# Patient Record
Sex: MALE | Race: WHITE | Employment: FULL TIME | URBAN - NONMETROPOLITAN AREA
[De-identification: names, ages, dates, MRNs, and addresses within clinical notes are randomized per-mention and may not be internally consistent; named-entity substitution may affect disease eponyms.]

---

## 2024-04-11 ENCOUNTER — TELEPHONE (OUTPATIENT)
Dept: CARDIOLOGY CLINIC | Age: 49
End: 2024-04-11

## 2024-04-11 ENCOUNTER — OFFICE VISIT (OUTPATIENT)
Dept: CARDIOLOGY CLINIC | Age: 49
End: 2024-04-11
Payer: COMMERCIAL

## 2024-04-11 VITALS
SYSTOLIC BLOOD PRESSURE: 150 MMHG | WEIGHT: 213 LBS | DIASTOLIC BLOOD PRESSURE: 90 MMHG | BODY MASS INDEX: 29.82 KG/M2 | HEIGHT: 71 IN | HEART RATE: 90 BPM | OXYGEN SATURATION: 95 %

## 2024-04-11 DIAGNOSIS — I51.89 GRADE I DIASTOLIC DYSFUNCTION: ICD-10-CM

## 2024-04-11 DIAGNOSIS — I50.23 ACUTE ON CHRONIC SYSTOLIC CONGESTIVE HEART FAILURE, NYHA CLASS 3 (HCC): ICD-10-CM

## 2024-04-11 DIAGNOSIS — I25.5 ISCHEMIC CARDIOMYOPATHY: Primary | ICD-10-CM

## 2024-04-11 DIAGNOSIS — Z91.89 AT RISK FOR FLUID VOLUME OVERLOAD: ICD-10-CM

## 2024-04-11 PROCEDURE — 99214 OFFICE O/P EST MOD 30 MIN: CPT | Performed by: NURSE PRACTITIONER

## 2024-04-11 RX ORDER — TRAMADOL HYDROCHLORIDE 200 MG/1
200 TABLET, EXTENDED RELEASE ORAL DAILY
COMMUNITY
Start: 2024-01-24 | End: 2024-04-23

## 2024-04-11 RX ORDER — CLOPIDOGREL BISULFATE 75 MG/1
75 TABLET ORAL DAILY
COMMUNITY

## 2024-04-11 RX ORDER — LOSARTAN POTASSIUM 50 MG/1
50 TABLET ORAL DAILY
COMMUNITY
End: 2024-04-11 | Stop reason: ALTCHOICE

## 2024-04-11 RX ORDER — CARVEDILOL 12.5 MG/1
12.5 TABLET ORAL 2 TIMES DAILY
Qty: 180 TABLET | Refills: 3 | Status: SHIPPED | OUTPATIENT
Start: 2024-04-11

## 2024-04-11 RX ORDER — ATORVASTATIN CALCIUM 40 MG/1
40 TABLET, FILM COATED ORAL DAILY
COMMUNITY

## 2024-04-11 RX ORDER — DAPAGLIFLOZIN 10 MG/1
10 TABLET, FILM COATED ORAL DAILY
COMMUNITY

## 2024-04-11 RX ORDER — LISINOPRIL 20 MG/1
TABLET ORAL
COMMUNITY
Start: 2015-11-05 | End: 2024-04-11

## 2024-04-11 RX ORDER — SPIRONOLACTONE 25 MG/1
TABLET ORAL
COMMUNITY

## 2024-04-11 RX ORDER — BUPROPION HYDROCHLORIDE 300 MG/1
300 TABLET ORAL EVERY MORNING
COMMUNITY
Start: 2022-02-01

## 2024-04-11 RX ORDER — FOLIC ACID 1 MG/1
1 TABLET ORAL
COMMUNITY

## 2024-04-11 RX ORDER — CARVEDILOL 6.25 MG/1
TABLET ORAL
COMMUNITY
End: 2024-04-11 | Stop reason: SDUPTHER

## 2024-04-11 RX ORDER — LANOLIN ALCOHOL/MO/W.PET/CERES
100 CREAM (GRAM) TOPICAL DAILY
COMMUNITY
Start: 2024-02-01

## 2024-04-11 RX ORDER — TORSEMIDE 20 MG/1
20 TABLET ORAL DAILY
COMMUNITY
Start: 2024-02-01

## 2024-04-11 RX ORDER — ASPIRIN 81 MG/1
81 TABLET, COATED ORAL DAILY
COMMUNITY

## 2024-04-11 ASSESSMENT — ENCOUNTER SYMPTOMS
NAUSEA: 0
SHORTNESS OF BREATH: 1
COUGH: 0

## 2024-04-11 NOTE — PATIENT INSTRUCTIONS
You may receive a survey regarding the care you received during your visit.  Your input is valuable to us.  We encourage you to complete and return your survey.  We hope you will choose us in the future for your healthcare needs.    Your nurses today were Summer.  Office hours:   Mon-Thurs 8-4:30  Friday 8-12  Phone: [phone]    Continue:  Continue current medications  Daily weights and record  Fluid restriction of 2 Liters per day  Limit sodium in diet to around 4280-0548 mg/day  Monitor BP  Activity as tolerated     Call the Heart Failure Clinic for any of the following symptoms:   Weight gain of 2-3 pounds in 1 day or 5 pounds in 1 week  Increased shortness of breath  Shortness of breath while laying down  Cough  Chest pain  Swelling in feet, ankles or legs  Bloating in abdomen  Fatigue

## 2024-04-11 NOTE — PROGRESS NOTES
nephrology as well?)  Psoriasis - on apremilast previously which is a phosphodiesterase 4 inhibitor - if frequent exacerbations will need stopped (has not been taking recently)  Grade 1 DD   LVEDD at 5.2 (previously 6)   RVSP improved from 40 to 23  Cardiac rehab - ordered but pt declined, discussed again and plans on calling     Stable, no fluid on exam today. Tolerating current medications, making adjustments for GDMT. Will order repeat ECHO at next visit. 4 week f/u       GDMT: stopping losartan and starting Entresto, increasing Coreg today    Lab reviewed - K 4.4 Cr 1.32 Mag 2.3 Hgb 15.7    ECHO 2024: no atrial dilation or valvular concerns. RVSP 23  CATH 2024: s/p PCI to LAD/Lx, RCA has 50% lesion     Repeat blood work in two weeks     Start taking Entresto 49/51 twice a day    Start taking Coreg 12.5 twice a day     Check blood pressure 1 hr after morning medications    Continue diet/fluid adherence  Continue daily wts.  F/U w/ Cardiology  F/U in clinic in 4 weeks      Tolerating above noted HF meds, no ill side effects noted. Will continue to monitor kidney function and electrolytes. Will optimize as tolerated.   Pt is compliant w/ medications.    Total visit time of 55 minutes has been spent with patient on education of symptoms, management, medication, and plan of care; as well as review of chart: labs, ECHO, radiology reports, etc.   I personally spent more then 50% of the appt time face to face with the patient.  Daily weights  Fluid restriction of 2 Liters per day  Limit sodium in diet to around 0578-8874 mg/day  Monitor BP  Activity as tolerated     Patient was instructed to call the Heart Failure Clinic for any changes in symptoms as noted in AVS.      Return in about 4 weeks (around 5/9/2024). or sooner if needed     Patient given educational materials - see patient instructions.   We discussed the importance of weighing oneself and recording daily. We also discussed the importance of a low sodium

## 2024-05-01 LAB
BUN BLDV-MCNC: 20 MG/DL
CALCIUM SERPL-MCNC: 8.5 MG/DL
CHLORIDE BLD-SCNC: 110 MMOL/L
CO2: 24 MMOL/L
CREAT SERPL-MCNC: 1.2 MG/DL
EGFR: 69
GLUCOSE BLD-MCNC: 104 MG/DL
POTASSIUM SERPL-SCNC: 4.2 MMOL/L
SODIUM BLD-SCNC: 137 MMOL/L

## 2024-05-10 ENCOUNTER — OFFICE VISIT (OUTPATIENT)
Dept: CARDIOLOGY CLINIC | Age: 49
End: 2024-05-10
Payer: COMMERCIAL

## 2024-05-10 VITALS
HEIGHT: 71 IN | HEART RATE: 89 BPM | OXYGEN SATURATION: 95 % | WEIGHT: 212.5 LBS | DIASTOLIC BLOOD PRESSURE: 62 MMHG | SYSTOLIC BLOOD PRESSURE: 142 MMHG | BODY MASS INDEX: 29.75 KG/M2

## 2024-05-10 DIAGNOSIS — Z91.89 AT RISK FOR FLUID VOLUME OVERLOAD: ICD-10-CM

## 2024-05-10 DIAGNOSIS — I50.23 ACUTE ON CHRONIC SYSTOLIC CONGESTIVE HEART FAILURE, NYHA CLASS 3 (HCC): ICD-10-CM

## 2024-05-10 DIAGNOSIS — I25.5 ISCHEMIC CARDIOMYOPATHY: Primary | ICD-10-CM

## 2024-05-10 DIAGNOSIS — I51.89 GRADE I DIASTOLIC DYSFUNCTION: ICD-10-CM

## 2024-05-10 PROCEDURE — 99214 OFFICE O/P EST MOD 30 MIN: CPT | Performed by: NURSE PRACTITIONER

## 2024-05-10 RX ORDER — TRAMADOL HYDROCHLORIDE 200 MG/1
200 TABLET, EXTENDED RELEASE ORAL DAILY
COMMUNITY
Start: 2015-12-03

## 2024-05-10 RX ORDER — SPIRONOLACTONE 25 MG/1
25 TABLET ORAL DAILY
Qty: 90 TABLET | Refills: 3 | Status: SHIPPED | OUTPATIENT
Start: 2024-05-10

## 2024-05-10 ASSESSMENT — ENCOUNTER SYMPTOMS
VOMITING: 0
ABDOMINAL DISTENTION: 0
SHORTNESS OF BREATH: 1
NAUSEA: 0
COUGH: 0

## 2024-05-10 NOTE — PROGRESS NOTES
Na/fluid diet.     Patient has:  Chest Pain: tightness  SOB: continues but improved since PCI  Orthopnea/PND: no  ROOSEVELT: no  Edema: resolved  Fatigue: no  Abdominal bloating: no  Cough: improved   Appetite: good      Past Medical History:   Diagnosis Date    Heart failure (HCC) 01/26/2024     Past Surgical History:   Procedure Laterality Date    CARDIAC CATHETERIZATION  2024    with 1 stent     Family History   Problem Relation Age of Onset    Hypertension Mother     Diabetes Mother     Heart Disease Father     Heart Surgery Father     Heart Failure Father     Heart Failure Brother      Social History     Tobacco Use    Smoking status: Former     Types: Cigarettes    Smokeless tobacco: Former     Types: Chew     Quit date: 2023   Substance Use Topics    Alcohol use: Yes     Comment: ocassional     Current Outpatient Medications   Medication Sig Dispense Refill    traMADol (ULTRAM ER) 200 MG extended release tablet Take 1 tablet by mouth daily.      spironolactone (ALDACTONE) 25 MG tablet Take 1 tablet by mouth daily 90 tablet 3    ASPIRIN LOW DOSE 81 MG EC tablet Take 1 tablet by mouth daily      atorvastatin (LIPITOR) 40 MG tablet Take 1 tablet by mouth daily      betamethasone valerate (VALISONE) 0.1 % ointment Betamethasone Valerate Active 1 applic TOP TWICE DAILY August 18th, 2022 11:00pm      buPROPion (WELLBUTRIN XL) 300 MG extended release tablet Take 1 tablet by mouth every morning      clopidogrel (PLAVIX) 75 MG tablet Take 1 tablet by mouth daily      FARXIGA 10 MG tablet Take 1 tablet by mouth daily      folic acid (FOLVITE) 1 MG tablet Take 1 tablet by mouth Every Day      halobetasol (ULTRAVATE) 0.05 % cream Apply topically in the morning and at bedtime      thiamine 100 MG tablet Take 1 tablet by mouth daily      torsemide (DEMADEX) 20 MG tablet Take 1 tablet by mouth daily      sacubitril-valsartan (ENTRESTO) 49-51 MG per tablet Take 1 tablet by mouth 2 times daily 180 tablet 3    carvedilol (COREG)

## 2024-05-10 NOTE — PATIENT INSTRUCTIONS
You may receive a survey regarding the care you received during your visit.  Your input is valuable to us.  We encourage you to complete and return your survey.  We hope you will choose us in the future for your healthcare needs.    Your nurses today were Summer.  Office hours:   Mon-Thurs 8-4:30  Friday 8-12  Phone: [phone]    Continue:  Continue current medications  Daily weights and record  Fluid restriction of 2 Liters per day  Limit sodium in diet to around 4053-0211 mg/day  Monitor BP  Activity as tolerated     Call the Heart Failure Clinic for any of the following symptoms:   Weight gain of -3 pounds in 1 day or 5 pounds in 1 week  Increased shortness of breath  Shortness of breath while laying down  Cough  Chest pain  Swelling in feet, ankles or legs  Bloating in abdomen  Fatigue

## 2024-05-13 ENCOUNTER — TELEPHONE (OUTPATIENT)
Dept: CARDIOLOGY CLINIC | Age: 49
End: 2024-05-13

## 2024-05-13 RX ORDER — TORSEMIDE 20 MG/1
20 TABLET ORAL DAILY PRN
Qty: 30 TABLET | Refills: 0 | Status: SHIPPED
Start: 2024-05-13

## 2024-05-15 NOTE — TELEPHONE ENCOUNTER
Taking spironolactone daily as ordered    Only one bp reading 94/66 HR 83. Will take bp/hr daily now

## 2024-05-28 ENCOUNTER — HOSPITAL ENCOUNTER (OUTPATIENT)
Age: 49
Discharge: HOME | End: 2024-05-30
Payer: COMMERCIAL

## 2024-05-28 VITALS
DIASTOLIC BLOOD PRESSURE: 62 MMHG | BODY MASS INDEX: 29.68 KG/M2 | WEIGHT: 212 LBS | HEIGHT: 71 IN | SYSTOLIC BLOOD PRESSURE: 142 MMHG

## 2024-05-28 DIAGNOSIS — I25.5 ISCHEMIC CARDIOMYOPATHY: ICD-10-CM

## 2024-05-28 LAB
ECHO AV CUSP MM: 2.3 CM
ECHO BSA: 2.19 M2
ECHO LA AREA 2C: 15.8 CM2
ECHO LA AREA 4C: 15.9 CM2
ECHO LA DIAMETER INDEX: 1.62 CM/M2
ECHO LA DIAMETER: 3.5 CM
ECHO LA MAJOR AXIS: 5.2 CM
ECHO LA MINOR AXIS: 5.1 CM
ECHO LA VOL BP: 40 ML (ref 18–58)
ECHO LA VOL MOD A2C: 40 ML (ref 18–58)
ECHO LA VOL MOD A4C: 39 ML (ref 18–58)
ECHO LA VOL/BSA BIPLANE: 19 ML/M2 (ref 16–34)
ECHO LA VOLUME INDEX MOD A2C: 19 ML/M2 (ref 16–34)
ECHO LA VOLUME INDEX MOD A4C: 18 ML/M2 (ref 16–34)
ECHO LV EDV A2C: 100 ML
ECHO LV EDV A4C: 113 ML
ECHO LV EDV INDEX A4C: 52 ML/M2
ECHO LV EDV NDEX A2C: 46 ML/M2
ECHO LV EJECTION FRACTION A2C: 40 %
ECHO LV EJECTION FRACTION A4C: 35 %
ECHO LV EJECTION FRACTION BIPLANE: 39 % (ref 55–100)
ECHO LV ESV A2C: 60 ML
ECHO LV ESV A4C: 73 ML
ECHO LV ESV INDEX A2C: 28 ML/M2
ECHO LV ESV INDEX A4C: 34 ML/M2
ECHO LV FRACTIONAL SHORTENING: 26 % (ref 28–44)
ECHO LV INTERNAL DIMENSION DIASTOLE INDEX: 2.31 CM/M2
ECHO LV INTERNAL DIMENSION DIASTOLIC: 5 CM (ref 4.2–5.9)
ECHO LV INTERNAL DIMENSION SYSTOLIC INDEX: 1.71 CM/M2
ECHO LV INTERNAL DIMENSION SYSTOLIC: 3.7 CM
ECHO LV IVSD: 1 CM (ref 0.6–1)
ECHO LV MASS 2D: 169.9 G (ref 88–224)
ECHO LV MASS INDEX 2D: 78.7 G/M2 (ref 49–115)
ECHO LV POSTERIOR WALL DIASTOLIC: 0.9 CM (ref 0.6–1)
ECHO LV RELATIVE WALL THICKNESS RATIO: 0.36
ECHO RV INTERNAL DIMENSION: 3.2 CM
ECHO RV TAPSE: 2.4 CM (ref 1.7–?)

## 2024-05-28 PROCEDURE — 93307 TTE W/O DOPPLER COMPLETE: CPT | Performed by: NUCLEAR MEDICINE

## 2024-05-28 PROCEDURE — 93307 TTE W/O DOPPLER COMPLETE: CPT

## 2024-05-29 ENCOUNTER — TELEPHONE (OUTPATIENT)
Dept: CARDIOLOGY CLINIC | Age: 49
End: 2024-05-29

## 2024-05-29 NOTE — TELEPHONE ENCOUNTER
----- Message from JERRELL Vincent - CNP sent at 5/29/2024  7:31 AM EDT -----  EF improved to 40-45% - can return his life vest - f/u on 6/7

## 2024-06-07 ENCOUNTER — OFFICE VISIT (OUTPATIENT)
Dept: CARDIOLOGY CLINIC | Age: 49
End: 2024-06-07

## 2024-06-07 VITALS
HEART RATE: 76 BPM | OXYGEN SATURATION: 95 % | SYSTOLIC BLOOD PRESSURE: 110 MMHG | BODY MASS INDEX: 30.66 KG/M2 | HEIGHT: 71 IN | DIASTOLIC BLOOD PRESSURE: 64 MMHG | WEIGHT: 219 LBS | RESPIRATION RATE: 18 BRPM

## 2024-06-07 DIAGNOSIS — I51.89 GRADE I DIASTOLIC DYSFUNCTION: ICD-10-CM

## 2024-06-07 DIAGNOSIS — Z91.89 AT RISK FOR FLUID VOLUME OVERLOAD: Primary | ICD-10-CM

## 2024-06-07 DIAGNOSIS — I50.23 ACUTE ON CHRONIC SYSTOLIC CONGESTIVE HEART FAILURE, NYHA CLASS 3 (HCC): ICD-10-CM

## 2024-06-07 DIAGNOSIS — I25.5 ISCHEMIC CARDIOMYOPATHY: ICD-10-CM

## 2024-06-07 RX ORDER — CARVEDILOL 25 MG/1
25 TABLET ORAL 2 TIMES DAILY
Qty: 180 TABLET | Refills: 3 | Status: SHIPPED | OUTPATIENT
Start: 2024-06-07

## 2024-06-07 RX ORDER — M-VIT,TX,IRON,MINS/CALC/FOLIC 27MG-0.4MG
1 TABLET ORAL DAILY
COMMUNITY

## 2024-06-07 ASSESSMENT — ENCOUNTER SYMPTOMS
NAUSEA: 0
VOMITING: 0
ABDOMINAL DISTENTION: 0
SHORTNESS OF BREATH: 1
COUGH: 0

## 2024-06-07 NOTE — PATIENT INSTRUCTIONS
You may receive a survey regarding the care you received during your visit.  Your input is valuable to us.  We encourage you to complete and return your survey.  We hope you will choose us in the future for your healthcare needs.    Your nurses today were Summer.  Office hours:   Mon-Thurs 8-4:30  Friday 8-12  Phone: [phone]    Continue:  Continue current medications  Daily weights and record  Fluid restriction of 2 Liters per day  Limit sodium in diet to around 4668-4804 mg/day  Monitor BP  Activity as tolerated     Call the Heart Failure Clinic for any of the following symptoms:   Weight gain of -3 pounds in 1 day or 5 pounds in 1 week  Increased shortness of breath  Shortness of breath while laying down  Cough  Chest pain  Swelling in feet, ankles or legs  Bloating in abdomen  Fatigue        No medication changes today.     Continue diet/fluid adherence  Continue daily wts.  F/U w/ Cardiology  F/U in clinic in 6 months

## 2024-06-07 NOTE — PROGRESS NOTES
WBC 11.05 03/12/2024 01:44 PM    RBC 5.14 03/12/2024 01:44 PM    HGB 15.7 03/12/2024 01:44 PM    HCT 46.7 03/12/2024 01:44 PM     03/12/2024 01:44 PM     CMP:    Lab Results   Component Value Date/Time     05/01/2024 03:22 PM    K 4.2 05/01/2024 03:22 PM     05/01/2024 03:22 PM    CO2 24 05/01/2024 03:22 PM    BUN 20 05/01/2024 03:22 PM    CREATININE 1.20 05/01/2024 03:22 PM    LABGLOM 69 05/01/2024 03:22 PM    GLUCOSE 104 05/01/2024 03:22 PM    CALCIUM 8.5 05/01/2024 03:22 PM     Hepatic Function Panel:    Lab Results   Component Value Date/Time    ALKPHOS 76 03/12/2024 01:44 PM    ALT 37 03/12/2024 01:44 PM    AST 38 03/12/2024 01:44 PM    BILITOT 0.8 03/12/2024 01:44 PM     Magnesium:    Lab Results   Component Value Date/Time    MG 2.3 03/12/2024 01:44 PM     PT/INR:  No results found for: \"PROTIME\", \"INR\"  Lipids:  No results found for: \"TRIG\", \"HDL\", \"LDLDIRECT\"    ASSESSMENT AND PLAN:   The patient's condition/symptoms are stable        Diagnosis Orders   1. At risk for fluid volume overload        2. Ischemic cardiomyopathy  carvedilol (COREG) 25 MG tablet      3. Acute on chronic systolic congestive heart failure, NYHA class 3, stage C (HCC)  carvedilol (COREG) 25 MG tablet      4. Grade I diastolic dysfunction          Continue:  GDMT:   ACE/ARB/ARNI - Entresto 49/51 BID   BB - Coreg 25mg BID    Diuretic - Torsemide 20 daily   AA - Aldactone 25 daily  SGLT2 -  Farxiga 10 daily   Vasodilator -   Other - ASA, Plavix       HFimpEF 40-45% 2024 (25-30% 3/2024) (5-10% 1/2024)   Improved ischemic cardiomyopathy 1/2024 - s/p PCI to LAD/Cx ([facility]) life vest   Wegeners granulomatosis lung disease - has an appointment with pulmonary at outside facility (may need to refer to nephrology as well?)  Psoriasis - on apremilast previously which is a phosphodiesterase 4 inhibitor - if frequent exacerbations will need stopped (has not been taking recently)  Grade 1 DD   LVEDD at 5.2 (previously 6)   RVSP

## 2024-08-20 ENCOUNTER — OFFICE VISIT (OUTPATIENT)
Dept: CARDIOLOGY CLINIC | Age: 49
End: 2024-08-20
Payer: COMMERCIAL

## 2024-08-20 VITALS
HEIGHT: 71 IN | BODY MASS INDEX: 31.33 KG/M2 | SYSTOLIC BLOOD PRESSURE: 116 MMHG | DIASTOLIC BLOOD PRESSURE: 72 MMHG | HEART RATE: 70 BPM | WEIGHT: 223.8 LBS

## 2024-08-20 DIAGNOSIS — Z95.5 STATUS POST INSERTION OF DRUG-ELUTING STENT INTO LEFT ANTERIOR DESCENDING (LAD) ARTERY: ICD-10-CM

## 2024-08-20 DIAGNOSIS — I10 PRIMARY HYPERTENSION: ICD-10-CM

## 2024-08-20 DIAGNOSIS — I50.42 CHRONIC COMBINED SYSTOLIC AND DIASTOLIC CONGESTIVE HEART FAILURE (HCC): ICD-10-CM

## 2024-08-20 DIAGNOSIS — E78.2 MIXED HYPERLIPIDEMIA: ICD-10-CM

## 2024-08-20 DIAGNOSIS — I25.10 CORONARY ARTERY DISEASE INVOLVING NATIVE CORONARY ARTERY OF NATIVE HEART WITHOUT ANGINA PECTORIS: ICD-10-CM

## 2024-08-20 DIAGNOSIS — I25.5 ISCHEMIC CARDIOMYOPATHY: Primary | ICD-10-CM

## 2024-08-20 PROCEDURE — 93000 ELECTROCARDIOGRAM COMPLETE: CPT | Performed by: INTERNAL MEDICINE

## 2024-08-20 PROCEDURE — 99215 OFFICE O/P EST HI 40 MIN: CPT | Performed by: INTERNAL MEDICINE

## 2024-08-20 PROCEDURE — 3078F DIAST BP <80 MM HG: CPT | Performed by: INTERNAL MEDICINE

## 2024-08-20 PROCEDURE — 3074F SYST BP LT 130 MM HG: CPT | Performed by: INTERNAL MEDICINE

## 2024-08-20 NOTE — PROGRESS NOTES
[facility] [place] [facility]  [facility] CARDIOLOGY  [address]  [address]  [facility] [facility] [address]  Dept: [phone]  Dept Fax: [phone]  Loc: [phone]         CHIEF COMPLAINT:  CAD  ICM  CCHF  HTN  HLD    HISTORY OF PRESENT ILLNESS:      The patient is a 49 y.o. white male who presents for further cardiac evaluation.  He was referred to [facility] for CCHF and ICM.  He was previously followed by another cardiologist in another [facility].  He began having severe SOB/TELLES in the late fall of 2023.  He eventually got so bad he went to ER in [facility].  He was found to be in CHF and had a cardiac cath followed by KIMBERLY of an 80% proximal LAD.  He has a 50% mid RCA that is being treated medically for now.  Patient had an EF of 5-10% at initial Dx.  It has risen to 30-35% and then 40-45% in May 2024.  He had a Life Vest which was discontinued with improvement in LV function.  Patient denies any history of MI.      The patient denies any chest pain, pressure, squeezing, tightness or discomfort with exertion.  He denies any significant shortness of breath or dyspnea on exertion at this time.  He denies any palpitations, heart racing, skipping or pounding.  He has very rare episodes of lightheadedness which only last for a few seconds when he gets up quickly.  There has been no syncope.  He does note that he just has felt tired a lot over the past year.    Patient has a history of hypertension but no diabetes.  He has history of hyperlipidemia.  He is a former smoker quit in 2005.  There is a positive family history of CAD mostly on his father side.     Past Medical History:  Past Medical History:   Diagnosis Date    Heart failure (HCC) 01/26/2024       Past Surgical History:  Past Surgical History:   Procedure Laterality Date    CARDIAC CATHETERIZATION  2024    with 1 stent       Allergies:  No Known Allergies     Medications:    Prior to Visit Medications    Medication Sig Taking?

## 2024-08-20 NOTE — PATIENT INSTRUCTIONS
If you receive a survey asking about your care experience, please respond. Your answers will help ensure you receive high-quality care at this office. Thank you!    Your Medical Assistant today: Allan  Thank you for coming to our office! It was a pleasure to serve you.

## 2024-09-20 ENCOUNTER — HOSPITAL ENCOUNTER (OUTPATIENT)
Age: 49
Discharge: HOME | End: 2024-09-22
Attending: INTERNAL MEDICINE
Payer: COMMERCIAL

## 2024-09-20 VITALS
BODY MASS INDEX: 31.48 KG/M2 | SYSTOLIC BLOOD PRESSURE: 116 MMHG | DIASTOLIC BLOOD PRESSURE: 72 MMHG | WEIGHT: 224.87 LBS | HEIGHT: 71 IN

## 2024-09-20 DIAGNOSIS — I50.42 CHRONIC COMBINED SYSTOLIC AND DIASTOLIC CONGESTIVE HEART FAILURE (HCC): ICD-10-CM

## 2024-09-20 PROCEDURE — 93306 TTE W/DOPPLER COMPLETE: CPT

## 2024-09-21 LAB
ECHO AO ASC DIAM: 3.1 CM
ECHO AO ASCENDING AORTA INDEX: 1.4 CM/M2
ECHO AO SINUS VALSALVA DIAM: 3.2 CM
ECHO AO SINUS VALSALVA INDEX: 1.45 CM/M2
ECHO AO ST JNCT DIAM: 2.7 CM
ECHO AV CUSP MM: 2.2 CM
ECHO AV MEAN GRADIENT: 3 MMHG
ECHO AV MEAN VELOCITY: 0.8 M/S
ECHO AV PEAK GRADIENT: 5 MMHG
ECHO AV PEAK VELOCITY: 1.2 M/S
ECHO AV VELOCITY RATIO: 0.67
ECHO AV VTI: 20.3 CM
ECHO BSA: 2.26 M2
ECHO EST RA PRESSURE: 5 MMHG
ECHO LA AREA 2C: 12.1 CM2
ECHO LA AREA 4C: 15.4 CM2
ECHO LA DIAMETER INDEX: 1.67 CM/M2
ECHO LA DIAMETER: 3.7 CM
ECHO LA MAJOR AXIS: 5.5 CM
ECHO LA MINOR AXIS: 4.3 CM
ECHO LA VOL MOD A2C: 28 ML (ref 18–58)
ECHO LA VOL MOD A4C: 35 ML (ref 18–58)
ECHO LA VOLUME INDEX MOD A2C: 13 ML/M2 (ref 16–34)
ECHO LA VOLUME INDEX MOD A4C: 16 ML/M2 (ref 16–34)
ECHO LV E' LATERAL VELOCITY: 10.9 CM/S
ECHO LV E' SEPTAL VELOCITY: 8.7 CM/S
ECHO LV EDV A2C: 70 ML
ECHO LV EDV A4C: 92 ML
ECHO LV EDV INDEX A4C: 42 ML/M2
ECHO LV EDV NDEX A2C: 32 ML/M2
ECHO LV EF PHYSICIAN: 55 %
ECHO LV EJECTION FRACTION A2C: 45 %
ECHO LV EJECTION FRACTION A4C: 45 %
ECHO LV EJECTION FRACTION BIPLANE: 46 % (ref 55–100)
ECHO LV ESV A2C: 38 ML
ECHO LV ESV A4C: 51 ML
ECHO LV ESV INDEX A2C: 17 ML/M2
ECHO LV ESV INDEX A4C: 23 ML/M2
ECHO LV FRACTIONAL SHORTENING: 27 % (ref 28–44)
ECHO LV INTERNAL DIMENSION DIASTOLE INDEX: 2.22 CM/M2
ECHO LV INTERNAL DIMENSION DIASTOLIC: 4.9 CM (ref 4.2–5.9)
ECHO LV INTERNAL DIMENSION SYSTOLIC INDEX: 1.63 CM/M2
ECHO LV INTERNAL DIMENSION SYSTOLIC: 3.6 CM
ECHO LV ISOVOLUMETRIC RELAXATION TIME (IVRT): 79 MS
ECHO LV IVSD: 1 CM (ref 0.6–1)
ECHO LV MASS 2D: 176 G (ref 88–224)
ECHO LV MASS INDEX 2D: 79.7 G/M2 (ref 49–115)
ECHO LV POSTERIOR WALL DIASTOLIC: 1 CM (ref 0.6–1)
ECHO LV RELATIVE WALL THICKNESS RATIO: 0.41
ECHO LVOT AV VTI INDEX: 0.65
ECHO LVOT MEAN GRADIENT: 1 MMHG
ECHO LVOT PEAK GRADIENT: 2 MMHG
ECHO LVOT PEAK VELOCITY: 0.8 M/S
ECHO LVOT VTI: 13.2 CM
ECHO MV A VELOCITY: 0.61 M/S
ECHO MV E DECELERATION TIME (DT): 187 MS
ECHO MV E VELOCITY: 0.55 M/S
ECHO MV E/A RATIO: 0.9
ECHO MV E/E' LATERAL: 5.05
ECHO MV E/E' RATIO (AVERAGED): 5.68
ECHO MV E/E' SEPTAL: 6.32
ECHO PV MAX VELOCITY: 0.6 M/S
ECHO PV PEAK GRADIENT: 2 MMHG
ECHO RIGHT VENTRICULAR SYSTOLIC PRESSURE (RVSP): 29 MMHG
ECHO RV FREE WALL PEAK S': 14.5 CM/S
ECHO RV INTERNAL DIMENSION: 2.7 CM
ECHO RV TAPSE: 2.5 CM (ref 1.7–?)
ECHO TV E WAVE: 0.6 M/S
ECHO TV REGURGITANT MAX VELOCITY: 2.47 M/S
ECHO TV REGURGITANT PEAK GRADIENT: 24 MMHG

## 2024-09-21 PROCEDURE — 93306 TTE W/DOPPLER COMPLETE: CPT | Performed by: INTERNAL MEDICINE

## 2024-11-13 ENCOUNTER — TELEPHONE (OUTPATIENT)
Dept: CARDIOLOGY CLINIC | Age: 49
End: 2024-11-13

## 2024-11-13 NOTE — TELEPHONE ENCOUNTER
----- Message from JERRELL Vincent CNP sent at 11/13/2024 11:00 AM EST -----  Regarding: RE:  3 months from his appointment if no symptoms  ----- Message -----  From: Akua Beebe RN  Sent: 11/11/2024   4:05 PM EST  To: JERRELL Vincent CNP    This is the day you are taking off. Patient is seeing Dr Ellison on 11/26- yours is scheduled 12/6  When would you like me to move f/u too?

## 2024-11-26 ENCOUNTER — OFFICE VISIT (OUTPATIENT)
Dept: CARDIOLOGY CLINIC | Age: 49
End: 2024-11-26
Payer: COMMERCIAL

## 2024-11-26 VITALS
SYSTOLIC BLOOD PRESSURE: 119 MMHG | DIASTOLIC BLOOD PRESSURE: 79 MMHG | WEIGHT: 229.4 LBS | BODY MASS INDEX: 32.84 KG/M2 | HEART RATE: 81 BPM | HEIGHT: 70 IN

## 2024-11-26 DIAGNOSIS — I50.42 CHRONIC COMBINED SYSTOLIC AND DIASTOLIC CONGESTIVE HEART FAILURE (HCC): ICD-10-CM

## 2024-11-26 DIAGNOSIS — Z95.5 STATUS POST INSERTION OF DRUG-ELUTING STENT INTO LEFT ANTERIOR DESCENDING (LAD) ARTERY: ICD-10-CM

## 2024-11-26 DIAGNOSIS — I25.10 CORONARY ARTERY DISEASE INVOLVING NATIVE CORONARY ARTERY OF NATIVE HEART WITHOUT ANGINA PECTORIS: Primary | ICD-10-CM

## 2024-11-26 DIAGNOSIS — I25.5 ISCHEMIC CARDIOMYOPATHY: ICD-10-CM

## 2024-11-26 DIAGNOSIS — E78.2 MIXED HYPERLIPIDEMIA: ICD-10-CM

## 2024-11-26 DIAGNOSIS — I10 PRIMARY HYPERTENSION: ICD-10-CM

## 2024-11-26 PROCEDURE — 1036F TOBACCO NON-USER: CPT | Performed by: INTERNAL MEDICINE

## 2024-11-26 PROCEDURE — 3074F SYST BP LT 130 MM HG: CPT | Performed by: INTERNAL MEDICINE

## 2024-11-26 PROCEDURE — 3078F DIAST BP <80 MM HG: CPT | Performed by: INTERNAL MEDICINE

## 2024-11-26 PROCEDURE — G8427 DOCREV CUR MEDS BY ELIG CLIN: HCPCS | Performed by: INTERNAL MEDICINE

## 2024-11-26 PROCEDURE — G8417 CALC BMI ABV UP PARAM F/U: HCPCS | Performed by: INTERNAL MEDICINE

## 2024-11-26 PROCEDURE — 99214 OFFICE O/P EST MOD 30 MIN: CPT | Performed by: INTERNAL MEDICINE

## 2024-11-26 PROCEDURE — 93000 ELECTROCARDIOGRAM COMPLETE: CPT | Performed by: INTERNAL MEDICINE

## 2024-11-26 PROCEDURE — G8484 FLU IMMUNIZE NO ADMIN: HCPCS | Performed by: INTERNAL MEDICINE

## 2024-11-26 NOTE — PROGRESS NOTES
Follow up to discuss test results.    Echo done on 09/20/2024.    EKG done today.    Liver, Lipid, and BMP in  media from 11/22/2024.    Denies chest pain, palpitations, dizziness, shortness of breath, and edema.     No cardiac concerns at this time.       
\"BNP\"     EKG:   Sinus Rhythm 81  Left atrial enlargement.  IRBBB  NS ST-T abnormalities  ABNORMAL ECG    Cardiac Imaging:    ECHO:   Results for orders placed or performed during the hospital encounter of 09/20/24   Echo (TTE) complete (PRN contrast/bubble/strain/3D)   Result Value Ref Range    LV EDV A2C 70 mL    LV EDV A4C 92 mL    LV ESV A2C 38 mL    LV ESV A4C 51 mL    LV IVRT 79.0 ms    IVSd 1.0 0.6 - 1.0 cm    LVIDd 4.9 4.2 - 5.9 cm    LVIDs 3.6 cm    LVOT Mean Gradient 1 mmHg    LVOT VTI 13.2 cm    LVOT Peak Velocity 0.8 m/s    LVOT Peak Gradient 2 mmHg    LVPWd 1.0 0.6 - 1.0 cm    LV E' Lateral Velocity 10.9 cm/s    LV E' Septal Velocity 8.7 cm/s    LV Ejection Fraction A2C 45 %    LV Ejection Fraction A4C 45 %    EF BP 46 (A) 55 - 100 %    LA Minor Axis 4.3 cm    LA Major [place] 5.5 cm    LA Area 2C 12.1 cm2    LA Area 4C 15.4 cm2    LA Volume MOD A2C 28 18 - 58 mL    LA Volume MOD A4C 35 18 - 58 mL    LA Diameter 3.7 cm    AV Cusp Mmode 2.2 cm    AV Mean Gradient 3 mmHg    AV VTI 20.3 cm    AV Mean Velocity 0.8 m/s    AV Peak Velocity 1.2 m/s    AV Peak Gradient 5 mmHg    Ascending Aorta 3.1 cm    Sinotubular Junction 2.7 cm    Aortic Sinus Valsalva 3.2 cm    MV E Wave Deceleration Time 187.0 ms    MV A Velocity 0.61 m/s    MV E Velocity 0.55 m/s    PV Max Velocity 0.6 m/s    PV Peak Gradient 2 mmHg    Est. RA Pressure 5 mmHg    RVIDd 2.7 cm    RV Free Wall Peak S' 14.5 cm/s    TAPSE 2.5 1.7 cm    TR Max Velocity 2.47 m/s    TR Peak Gradient 24 mmHg    TV E Wave Velocity 0.6 m/s    Body Surface Area 2.26 m2    Fractional Shortening 2D 27 28 - 44 %    LV ESV Index A4C 23 mL/m2    LV EDV Index A4C 42 mL/m2    LV ESV Index A2C 17 mL/m2    LV EDV Index A2C 32 mL/m2    LVIDd Index 2.22 cm/m2    LVIDs Index 1.63 cm/m2    LV RWT Ratio 0.41     LV Mass 2D 176.0 88 - 224 g    LV Mass 2D Index 79.7 49 - 115 g/m2    MV E/A 0.90     E/E' Ratio (Averaged) 5.68     E/E' Lateral 5.05     E/E' Septal 6.32     LA Volume

## 2024-11-27 ENCOUNTER — TELEPHONE (OUTPATIENT)
Dept: CARDIOLOGY CLINIC | Age: 49
End: 2024-11-27

## 2024-11-27 NOTE — TELEPHONE ENCOUNTER
Patients wife calling about short term disability ppw for patient. States Crys Pond CNP started this for him and has him out until 01/23/2025    States the company wants documentation from Dr. Ellison.     Called [facility] office and asked if there was something needed from us?  Office visit note and recent cardiac testing sent to [phone]

## 2025-02-20 ENCOUNTER — TELEPHONE (OUTPATIENT)
Dept: CARDIOLOGY CLINIC | Age: 50
End: 2025-02-20

## 2025-02-20 DIAGNOSIS — I50.42 CHRONIC COMBINED SYSTOLIC AND DIASTOLIC CONGESTIVE HEART FAILURE (HCC): Primary | ICD-10-CM

## 2025-02-20 DIAGNOSIS — E78.2 MIXED HYPERLIPIDEMIA: ICD-10-CM

## 2025-02-20 DIAGNOSIS — I25.10 CORONARY ARTERY DISEASE INVOLVING NATIVE CORONARY ARTERY OF NATIVE HEART WITHOUT ANGINA PECTORIS: ICD-10-CM

## 2025-02-20 NOTE — TELEPHONE ENCOUNTER
Patient notified  Verbalized understanding  F/u with CHF on Monday  Will schedule appt with PCP  Patient reports did not fast for lipids and requested to repeat lipids

## 2025-02-20 NOTE — TELEPHONE ENCOUNTER
Cholesterol is ok.  Triglycerides are up a bit more.  Get TSH with reflex T4 and HgbA1C.  Patient may want to see PCP about triglycerides.

## 2025-02-24 ENCOUNTER — OFFICE VISIT (OUTPATIENT)
Dept: CARDIOLOGY CLINIC | Age: 50
End: 2025-02-24
Payer: COMMERCIAL

## 2025-02-24 VITALS
BODY MASS INDEX: 33.36 KG/M2 | HEIGHT: 70 IN | SYSTOLIC BLOOD PRESSURE: 148 MMHG | WEIGHT: 233 LBS | DIASTOLIC BLOOD PRESSURE: 100 MMHG | HEART RATE: 82 BPM | OXYGEN SATURATION: 97 %

## 2025-02-24 DIAGNOSIS — I50.32 CHRONIC DIASTOLIC CONGESTIVE HEART FAILURE, NYHA CLASS 2 (HCC): ICD-10-CM

## 2025-02-24 DIAGNOSIS — I25.5 ISCHEMIC CARDIOMYOPATHY: Primary | ICD-10-CM

## 2025-02-24 DIAGNOSIS — Z91.89 AT RISK FOR FLUID VOLUME OVERLOAD: ICD-10-CM

## 2025-02-24 PROCEDURE — 1036F TOBACCO NON-USER: CPT | Performed by: NURSE PRACTITIONER

## 2025-02-24 PROCEDURE — G8417 CALC BMI ABV UP PARAM F/U: HCPCS | Performed by: NURSE PRACTITIONER

## 2025-02-24 PROCEDURE — 99214 OFFICE O/P EST MOD 30 MIN: CPT | Performed by: NURSE PRACTITIONER

## 2025-02-24 PROCEDURE — G8427 DOCREV CUR MEDS BY ELIG CLIN: HCPCS | Performed by: NURSE PRACTITIONER

## 2025-02-24 RX ORDER — TRAMADOL HYDROCHLORIDE 50 MG/1
50 TABLET ORAL 3 TIMES DAILY PRN
COMMUNITY
Start: 2024-11-21

## 2025-02-24 RX ORDER — APREMILAST 30 MG/1
1 TABLET, FILM COATED ORAL 2 TIMES DAILY
COMMUNITY
Start: 2024-12-19

## 2025-02-24 RX ORDER — BUPROPION HYDROCHLORIDE 150 MG/1
TABLET ORAL
COMMUNITY
Start: 2025-01-17

## 2025-02-24 RX ORDER — TORSEMIDE 20 MG/1
20 TABLET ORAL DAILY PRN
Qty: 60 TABLET | Refills: 3 | Status: SHIPPED | OUTPATIENT
Start: 2025-02-24 | End: 2025-02-24

## 2025-02-24 RX ORDER — TORSEMIDE 20 MG/1
20 TABLET ORAL
Qty: 45 TABLET | Refills: 3 | Status: SHIPPED | OUTPATIENT
Start: 2025-02-24

## 2025-02-24 RX ORDER — POTASSIUM CHLORIDE 750 MG/1
10 TABLET, EXTENDED RELEASE ORAL PRN
Qty: 60 TABLET | Refills: 3 | Status: SHIPPED | OUTPATIENT
Start: 2025-02-24 | End: 2025-02-24

## 2025-02-24 RX ORDER — POTASSIUM CHLORIDE 750 MG/1
10 TABLET, EXTENDED RELEASE ORAL
Qty: 45 TABLET | Refills: 3 | Status: SHIPPED | OUTPATIENT
Start: 2025-02-24

## 2025-02-24 ASSESSMENT — ENCOUNTER SYMPTOMS
NAUSEA: 0
SHORTNESS OF BREATH: 1
VOMITING: 0
COUGH: 0
ABDOMINAL DISTENTION: 0

## 2025-02-24 NOTE — PATIENT INSTRUCTIONS
You may receive a survey regarding the care you received during your visit.  Your input is valuable to us.  We encourage you to complete and return your survey.  We hope you will choose us in the future for your healthcare needs.    Your nurses today were Summer.  Office hours:   Mon-Thurs 8-4:30  Friday 8-12  Phone: [phone]    Continue:  Continue current medications  Daily weights and record  Fluid restriction of 2 Liters per day  Limit sodium in diet to around 6760-4542 mg/day  Monitor BP  Activity as tolerated     Call the Heart Failure Clinic for any of the following symptoms:   Weight gain of -3 pounds in 1 day or 5 pounds in 1 week  Increased shortness of breath  Shortness of breath while laying down  Cough  Chest pain  Swelling in feet, ankles or legs  Bloating in abdomen  Fatigue      Take torsemide daily for 5 days   Calling in 10meq of potassium to take with torsemide    Blood work in 6 weeks    Continue diet/fluid adherence  Continue daily wts.  F/U w/ Cardiology  F/U in clinic in 6 months

## 2025-02-24 NOTE — PROGRESS NOTES
Heart Failure Clinic       Visit Date: 2/24/2025  Cardiologist:  Dr. Acevedo   Primary Care Physician: Crys Brock, APRN - CNP    Saulo Wolfe is a 49 y.o. male who presents today for:  Chief Complaint   Patient presents with    Congestive Heart Failure       HPI:     TYPE HF: HFimpEF 50-55% 9/2024 (25-30% 3/2024)  (5-10% 1/2024 prior to PCI)   Cause: ischemic new 1/2024  Device:   HX: CAD s/p PCI to LAD/Cx  Dry Wt:  213 on 4/11/24, 212 on 5/10/24, 219 on 6/7/24, 233 on 2/24/25      Saulo Wolfe is a 49 y.o. male who presents to the office for a f/u patient visit in the heart failure clinic.    Concerns today: 6 month f/u. Weight is up 12lbs. He nots that his weight can fluctuate day to day by 6-8lbs. He does take his torsemide about twice a week. Good urination. Hypertensive in office but has not taken his medications yet today.     Activity: ADLs performed with some SOB - better since stent  Diet: low Na/fluid diet.     Patient has:  Chest Pain: no   SOB: continues but improved since PCI - intermittent   Orthopnea/PND: no   ROOSEVELT: no  Edema: intermittent  Fatigue: no  Abdominal bloating: no  Cough: improved   Appetite: good    Visit on 6/7/24: here today for his 6 week f/u. He does note a 7lb weight gain. He denies leg swelling, bloating, orthopnea, and PND. He has not need his torsemide. He notes more energy and not napping anymore. [facility] made PRN. Tolerating meds, not feeling lightheaded/dizzy. No CP    Visit on 5/10/24: here today for his 4 week f/u. Pt tolerating current medications. He is c/o of intermittent orthostatic dizziness. SBP is running 110s/120s at home after medications. He is not sure if he is taking his torsemide. He denies leg swelling, bloating, orthopnea, and PND. He denies SOB but notes he still fatigues easily. Still does not want to do cardiac rehab. Life vest on.     Visit on 5/10/24: here today as a new pt referred to us by [facility] for closer management of his newly found ischemic

## 2025-04-03 DIAGNOSIS — I25.5 ISCHEMIC CARDIOMYOPATHY: ICD-10-CM

## 2025-04-03 DIAGNOSIS — I50.23 ACUTE ON CHRONIC SYSTOLIC CONGESTIVE HEART FAILURE, NYHA CLASS 3 (HCC): ICD-10-CM

## 2025-04-03 RX ORDER — SACUBITRIL AND VALSARTAN 49; 51 MG/1; MG/1
1 TABLET, FILM COATED ORAL 2 TIMES DAILY
Qty: 180 TABLET | Refills: 0 | Status: SHIPPED | OUTPATIENT
Start: 2025-04-03

## 2025-05-27 ENCOUNTER — OFFICE VISIT (OUTPATIENT)
Dept: CARDIOLOGY CLINIC | Age: 50
End: 2025-05-27
Payer: COMMERCIAL

## 2025-05-27 VITALS
HEART RATE: 89 BPM | HEIGHT: 71 IN | SYSTOLIC BLOOD PRESSURE: 149 MMHG | BODY MASS INDEX: 33.01 KG/M2 | WEIGHT: 235.8 LBS | DIASTOLIC BLOOD PRESSURE: 106 MMHG

## 2025-05-27 DIAGNOSIS — E78.2 MIXED HYPERLIPIDEMIA: ICD-10-CM

## 2025-05-27 DIAGNOSIS — I50.42 CHRONIC COMBINED SYSTOLIC AND DIASTOLIC CONGESTIVE HEART FAILURE (HCC): ICD-10-CM

## 2025-05-27 DIAGNOSIS — I10 PRIMARY HYPERTENSION: ICD-10-CM

## 2025-05-27 DIAGNOSIS — Z95.5 STATUS POST INSERTION OF DRUG-ELUTING STENT INTO LEFT ANTERIOR DESCENDING (LAD) ARTERY: ICD-10-CM

## 2025-05-27 DIAGNOSIS — I25.10 CORONARY ARTERY DISEASE INVOLVING NATIVE CORONARY ARTERY OF NATIVE HEART WITHOUT ANGINA PECTORIS: Primary | ICD-10-CM

## 2025-05-27 DIAGNOSIS — I25.5 ISCHEMIC CARDIOMYOPATHY: ICD-10-CM

## 2025-05-27 PROCEDURE — 3077F SYST BP >= 140 MM HG: CPT | Performed by: INTERNAL MEDICINE

## 2025-05-27 PROCEDURE — G8417 CALC BMI ABV UP PARAM F/U: HCPCS | Performed by: INTERNAL MEDICINE

## 2025-05-27 PROCEDURE — G8427 DOCREV CUR MEDS BY ELIG CLIN: HCPCS | Performed by: INTERNAL MEDICINE

## 2025-05-27 PROCEDURE — 93000 ELECTROCARDIOGRAM COMPLETE: CPT | Performed by: INTERNAL MEDICINE

## 2025-05-27 PROCEDURE — 99213 OFFICE O/P EST LOW 20 MIN: CPT | Performed by: INTERNAL MEDICINE

## 2025-05-27 PROCEDURE — 3080F DIAST BP >= 90 MM HG: CPT | Performed by: INTERNAL MEDICINE

## 2025-05-27 PROCEDURE — 1036F TOBACCO NON-USER: CPT | Performed by: INTERNAL MEDICINE

## 2025-05-27 NOTE — PROGRESS NOTES
Pt C/O sob, swelling in face and hands, fatigued      Pt denies CP, Headache, dizziness, heart palpitations  
49 - 115 g/m2    MV E/A 0.90     E/E' Ratio (Averaged) 5.68     E/E' Lateral 5.05     E/E' Septal 6.32     LA Volume Index MOD A2C 13 (A) 16 - 34 ml/m2    LA Volume Index MOD A4C 16 16 - 34 ml/m2    LA Size Index 1.67 cm/m2    Aortic Sinus Valsalva Index 1.45 cm/m2    Ascending Aorta Index 1.40 cm/m2    AV Velocity Ratio 0.67     LVOT:AV VTI Index 0.65     RVSP 29 mmHg    EF Physician 55 %    Narrative      Left Ventricle: Normal left ventricular systolic function with a   visually estimated EF of 50 - 55%. Left ventricle size is normal. Normal   wall thickness. Normal wall motion. Diastolic dysfunction present with   normal LV EF.    Pericardium: No pericardial effusion.    Image quality is adequate.         Stress Carla: No results found for this or any previous visit.    Stress Exercise: No results found for this or any previous visit.    Cardiac Monitor: No results found for this or any previous visit.     Cath:   At [place], [place]  CAD via cardiac catheterization in January 2024.  Status post KIMBERLY of proximal LAD January 2024.    Chest Xray: No results found for this or any previous visit.     CTA Heart: No results found for this or any previous visit.     CTA Chest: No results found for this or any previous visit.     JEEVAN: No results found for this or any previous visit.      ASSESSMENT/PLAN:      1. Coronary artery disease involving native coronary artery of native heart without angina pectoris  CAD via cardiac catheterization in January 2024.  Status post KIMBERLY of proximal LAD January 2024.    No angina  Continue current medications.  Continue clinical follow-up, medical therapy and risk factor modification.  - EKG 12 Lead    2. Status post insertion of drug-eluting stent into left anterior descending (LAD) artery  Status post KIMBERLY of proximal LAD for an 80% stenosis in January 2024.  Continue DAPT for minimum of 1 year and potentially longer.  - EKG 12 Lead    3. Ischemic cardiomyopathy  Patient has been called

## 2025-05-29 DIAGNOSIS — I50.23 ACUTE ON CHRONIC SYSTOLIC CONGESTIVE HEART FAILURE, NYHA CLASS 3 (HCC): ICD-10-CM

## 2025-05-29 DIAGNOSIS — I25.5 ISCHEMIC CARDIOMYOPATHY: ICD-10-CM

## 2025-05-29 RX ORDER — CARVEDILOL 25 MG/1
25 TABLET ORAL 2 TIMES DAILY
Qty: 180 TABLET | Refills: 4 | Status: SHIPPED | OUTPATIENT
Start: 2025-05-29

## 2025-07-05 DIAGNOSIS — I50.23 ACUTE ON CHRONIC SYSTOLIC CONGESTIVE HEART FAILURE, NYHA CLASS 3 (HCC): ICD-10-CM

## 2025-07-05 DIAGNOSIS — I25.5 ISCHEMIC CARDIOMYOPATHY: ICD-10-CM

## 2025-07-07 RX ORDER — SACUBITRIL AND VALSARTAN 49; 51 MG/1; MG/1
1 TABLET, FILM COATED ORAL 2 TIMES DAILY
Qty: 180 TABLET | Refills: 3 | Status: SHIPPED | OUTPATIENT
Start: 2025-07-07